# Patient Record
Sex: FEMALE | Race: WHITE
[De-identification: names, ages, dates, MRNs, and addresses within clinical notes are randomized per-mention and may not be internally consistent; named-entity substitution may affect disease eponyms.]

---

## 2017-09-18 NOTE — ED PDOC
Arrival/HPI





- General


Chief Complaint: Shortness Of Breath


Time Seen by Provider: 17 19:12





- History of Present Illness


Narrative History of Present Illness (Text): 





17 19:34


Patient is a 68 y/o F presenting with 2 day history of nasal congestion and non-

productive cough.  She reports her symptoms worsened today with some wheezing 

so she presented to ED.  Denies fever.  Denies chest pain.  Patient reports 

grandchildren with similar symptoms.


17 21:29








Past Medical History





- Provider Review


Nursing Documentation Reviewed: Yes





- Tetanus Immunization


Tetanus Immunization: Unknown





- Cardiac


Hx Hypertension: Yes





- Musculoskeletal/Rheumatological


Hx Arthritis: Yes





- Psychiatric


Hx Substance Use: No





- Surgical History


Hx  Section: Yes





- Anesthesia


Hx Anesthesia: Yes





Family/Social History





- Physician Review


Nursing Documentation Reviewed: Yes


Family/Social History: No Known Family HX


Smoking Status: Never Smoked


Hx Alcohol Use: No


Hx Substance Use: No


Hx Substance Use Treatment: No





Allergies/Home Meds


Allergies/Adverse Reactions: 


Allergies





No Known Allergies Allergy (Verified 17 19:07)


 








Home Medications: 


 Home Meds











 Medication  Instructions  Recorded  Confirmed


 


Albuterol HFA [Ventolin HFA 90 1 puff INH PRN PRN 17





mcg/actuation (8 g)]   


 


Aspirin [Aspirin Chewable] 81 mg PO DAILY 17


 


Calcium Carbonate/Vitamin D3 1 tab PO BID 17





[Calcium 500 + Vit D Caplet]   


 


Cholecalciferol [Vitamin D 1000 IU] 1 tab PO MON 17


 


Fexofenadine HCl [Aller-Ease] 60 mg PO DAILY 17


 


Fluticasone Propionate [Flonase 1 spray INH PRN PRN 17





Allergy Relief]   


 


Guaifenesin [Mucinex] 600 mg PO PRN PRN 17


 


Lisinopril [Zestril] 20 mg PO DAILY 17


 


Metoprolol Tartrate [Lopressor] 50 mg PO BID 17


 


metFORMIN [glucOPHAGE] 500 mg PO BID 17














Review of Systems





- Physician Review


All systems were reviewed & negative as marked: Yes





- Review of Systems


Constitutional: absent: Fatigue, Fevers


ENT: absent: Hearing Changes


Respiratory: SOB, Cough, Sputum, Wheezing


Cardiovascular: absent: Chest Pain, Palpitations, Edema, Calf Pain, SALINAS, 

Orthopnea, Syncope


Gastrointestinal: absent: Abdominal Pain, Constipation, Diarrhea, Nausea, 

Vomiting


Neurological: absent: Headache


Psychiatric: absent: Anxiety





Physical Exam


Vital Signs Reviewed: Yes


Vital Signs











  Temp Pulse Resp BP Pulse Ox


 


 17 21:37   112 H  20  160/86 H  100


 


 17 20:04   109 H  20  152/91 H  95


 


 17 19:20    22   97


 


 17 19:18  98.7 F  100 H  22  188/103 H  97











Temperature: Afebrile


Blood Pressure: Normal


Pulse: Regular


Respiratory Rate: Normal


Appearance: Positive for: Well-Appearing, Non-Toxic, Comfortable


Pain Distress: None


Mental Status: Positive for: Alert and Oriented X 3





- Systems Exam


Head: Present: Atraumatic, Normocephalic


Pupils: Present: PERRL


Extroacular Muscles: Present: EOMI


Conjunctiva: Present: Normal


Mouth: Present: Moist Mucous Membranes


Neck: Present: Normal Range of Motion


Respiratory/Chest: Present: Good Air Exchange, Wheezes.  No: Respiratory 

Distress, Accessory Muscle Use


Cardiovascular: Present: Regular Rate and Rhythm, Normal S1, S2.  No: Murmurs


Abdomen: No: Tenderness, Distention


Back: Present: Normal Inspection


Upper Extremity: Present: Normal Inspection


Lower Extremity: Present: Normal Inspection


Psychiatric: Present: Alert, Oriented x 3





Medical Decision Making


ED Course and Treatment: 





17 19:36


EKG shows sinus tachycardia at 104bpm with non-specific st changes





17 21:29


Cxray negative.  Wheezing resolved after 2 duonebs.  Patient reports that she 

feels better.  WBC mildly elevated, but afebrile and well appearing with normal 

cxray.  Lactate WNL.





17 22:07


Patient has been monitored in ED for 3.5 hours.  She has never been in any 

respiratory distress and has been maintaining her O2 saturation.  She has 

normal respiratory rate.  After 2 duonebs, wheezing was resolved.  After 3rd 

duoneb patient continues to feel well.  Patient has some residual tachycardia 

but likely from duonebs.  She reports that she feels better and wants to go 

home.  Spoke with daughter who agrees. Will dc patient with zpack, nebulizer 

and cough medication.  Patient and daughter were given detailed return 

instructions.  








- Lab Interpretations


Lab Results: 








 17 19:40 





 17 19:40 





 Lab Results





17 20:15: Influenza Typ A,B (EIA) Negative for flu a/b


17 20:05: pO2 44, VBG pH 7.42, VBG pCO2 47.0, VBG HCO3 30.5 H, VBG Total 

CO2 31.9 H, VBG O2 Sat (Calc) 84.9 H, VBG Base Excess 5.1 H, VBG Potassium 3.9, 

Glucose 120 H, Lactate 1.7, FiO2 21.0, Sodium 140.0, Chloride 105.0, Venous 

Blood Potassium 3.9


17 19:40: Sodium 141, Potassium 3.7, Chloride 101, Carbon Dioxide 28, 

Anion Gap 16, BUN 16, Creatinine 0.9, Est GFR (African Amer) > 60, Est GFR (Non-

Af Amer) > 60, Random Glucose 110, Calcium 9.4, Total Bilirubin 0.3, AST 26, 

ALT 32, Alkaline Phosphatase 60, Lactate Dehydrogenase 675, Total Creatine 

Kinase 304 H, CK-MB (CK-2) 2.6, CK-MB (CK-2) % Cancelled, Troponin I 0.02, NT-

Pro-B Natriuret Pep 154, Total Protein 7.8, Albumin 4.5, Globulin 3.3, Albumin/

Globulin Ratio 1.4


17 19:40: WBC 15.9 H D, RBC 4.68, Hgb 13.9, Hct 42.0, MCV 89.7, MCH 29.7, 

MCHC 33.1, RDW 13.8, Plt Count 286, MPV 10.7, Gran % 60.4, Lymph % (Auto) 24.3, 

Mono % (Auto) 6.9 H, Eos % (Auto) 7.6 H, Baso % (Auto) 0.8, Gran # 9.59 H, 

Lymph # 3.9 H, Mono # 1.1 H, Eos # 1.2 H, Baso # 0.12











- RAD Interpretation


Radiology Orders: 








17 19:33


CHEST TWO VIEWS (PA/LAT) [RAD] Stat 














- Medication Orders


Current Medication Orders: 











Discontinued Medications





Albuterol Sulfate (Albuterol 0.083% Inhal Sol (2.5 Mg/3 Ml) Ud)  2.5 mg IH STAT 

STA


   Stop: 17 21:29


   Last Admin: 17 21:32  Dose: 2.5 mg





Albuterol/Ipratropium (Duoneb 3 Mg/0.5 Mg (3 Ml) Ud)  3 ml IH STAT STA


   Stop: 17 19:34


   Last Admin: 17 19:35  Dose: 3 ml





Albuterol/Ipratropium (Duoneb 3 Mg/0.5 Mg (3 Ml) Ud)  3 ml IH STAT STA


   Stop: 17 19:37


   Last Admin: 17 20:05  Dose: 3 ml





Azithromycin (Zithromax)  500 mg PO STAT STA


   PRN Reason: Protocol


   Stop: 17 21:26


   Last Admin: 17 21:32  Dose: 500 mg





Benzonatate (Tessalon Perles)  100 mg PO STAT STA


   Stop: 17 19:37


   Last Admin: 17 20:05  Dose: 100 mg





Promethazine HCl/Codeine (Phenergan/Codeine Oral Syrup)  5 ml PO STAT STA


   Stop: 17 22:22











Disposition/Present on Arrival





- Present on Arrival


Any Indicators Present on Arrival: No


History of DVT/PE: No


History of Uncontrolled Diabetes: No


Urinary Catheter: No


History of Decub. Ulcer: No


History Surgical Site Infection Following: None





- Disposition


Have Diagnosis and Disposition been Completed?: Yes


Diagnosis: 


 Upper respiratory infection





Disposition: HOME/ ROUTINE


Disposition Time: 22:13


Patient Plan: Discharge


Patient Problems: 


 Current Active Problems











Problem Status Onset


 


Upper respiratory infection Acute  











Condition: GOOD


Discharge Instructions (ExitCare):  Upper Respiratory Infection (ED), Viral 

Syndrome (ED), Wheezing (ED)


Additional Instructions: 


Follow-up with PMD within 2 days.  Use nebulizer every 6 hours.  Return 

immediately with any worsening symptoms.  Take full course of antibiotics.


Prescriptions: 


Albuterol HFA [Ventolin HFA 90 mcg/actuation (8 g)] 2 puff IH Q6H #1 puff


Azithromycin 250 mg PO DAILY #4 tablet


Benzonatate [Tessalon Perles] 100 mg PO TID PRN #20 sgl


 PRN Reason: Cough


Referrals: 


Jensen Nguyen MD [Primary Care Provider] - Follow up with primary


Forms:  [employer] (English), WORK NOTE

## 2017-09-19 NOTE — CARD
--------------- APPROVED REPORT --------------





EKG Measurement

Heart [id number]

[id number]

[id number]

[id number]

[id number]



<Conclusion>

Sinus tachycardia

Nonspecific ST abnormality

Abnormal ECG

## 2017-09-19 NOTE — RAD
HISTORY:

cough  



COMPARISON:

05/12/2013.



TECHNIQUE:

Chest PA and lateral



FINDINGS:



LUNGS:

The lungs are well inflated and clear.



PLEURA:

No significant pleural effusion identified. No pneumothorax apparent.



CARDIOVASCULAR:

Normal.



OSSEOUS STRUCTURES:

Within normal limits for the patient's age.



VISUALIZED UPPER ABDOMEN:

Normal.



OTHER FINDINGS:

None.



IMPRESSION:

No active pulmonary disease.

## 2019-02-04 ENCOUNTER — HOSPITAL ENCOUNTER (OUTPATIENT)
Dept: HOSPITAL 42 - RAD | Age: 69
End: 2019-02-04
Payer: MEDICARE